# Patient Record
Sex: FEMALE | Race: WHITE | Employment: FULL TIME | URBAN - METROPOLITAN AREA
[De-identification: names, ages, dates, MRNs, and addresses within clinical notes are randomized per-mention and may not be internally consistent; named-entity substitution may affect disease eponyms.]

---

## 2017-09-25 ENCOUNTER — HOSPITAL ENCOUNTER (OUTPATIENT)
Dept: WOMENS IMAGING | Age: 62
Discharge: HOME | End: 2017-09-25
Attending: FAMILY MEDICINE | Admitting: FAMILY MEDICINE

## 2017-09-25 DIAGNOSIS — N64.4 MASTODYNIA OF RIGHT BREAST: ICD-10-CM

## 2017-09-25 DIAGNOSIS — N64.4 MASTODYNIA: ICD-10-CM

## 2017-10-02 ENCOUNTER — HOSPITAL ENCOUNTER (OUTPATIENT)
Dept: WOMENS IMAGING | Age: 62
Discharge: HOME | End: 2017-10-02
Admitting: FAMILY MEDICINE

## 2017-10-02 VITALS
SYSTOLIC BLOOD PRESSURE: 144 MMHG | OXYGEN SATURATION: 99 % | DIASTOLIC BLOOD PRESSURE: 73 MMHG | RESPIRATION RATE: 16 BRPM

## 2017-10-02 DIAGNOSIS — N64.4 MASTODYNIA: ICD-10-CM

## 2017-10-02 DIAGNOSIS — R92.8 ABNORMAL MAMMOGRAM: ICD-10-CM

## 2017-10-02 NOTE — IP AVS SNAPSHOT
Allergies as of 10/2/2017        Reactions    Erythromycin     Macrolides And Ketolides       Immunizations as of 10/2/2017     No immunizations on file. Last Vitals          Most Recent Value    Temp      Pulse      Resp  16    BP  (!)  144/73         After Visit Summary    This summary was created for you. Thank you for entrusting your care to us. The following information includes details about your hospital/visit stay along with steps you should take to help with your recovery once you leave the hospital.  In this packet, you will find information about the topics listed below:    · Instructions about your medications including a list of your home medications  · A summary of your hospital visit  · Follow-up appointments once you have left the hospital  · Your care plan at home      You may receive a survey regarding the care you received during your stay. Your input is valuable to us. We encourage you to complete and return your survey in the envelope provided. We hope you will choose us in the future for your healthcare needs. Patient Information     Patient Name TERESA Coon 1955      Care Provided at:     Name Address Phone       [address] [place] [address] [phone]            Your Visit    Here you will find information about your visit, including the reason for your visit. Please take this sheet with you when you visit your doctor or other health care provider in the future. It will help determine the best possible medical care for you at that time. If you have any questions once you leave the hospital, please call the department phone number listed below. Why you were here     Your primary diagnosis was:  Not on File      Visit Information     Date & Time Department Dept.  Phone    10/2/2017 [facility] [phone]       Follow-up Appointments

## 2017-10-02 NOTE — PROGRESS NOTES
Intra Breast Biopsy Nursing Note    NAME:  Shyam Leiva OF BIRTH:  1955 GENDER: female  MEDICAL RECORD NUMBER:  [id number]  DATE:  10/2/2017     [x]   Breast Biopsy completed by physician.  [x]   Expiration date site marker checked immediately prior to use.  [x]   Package intact prior to use and no damage noted.  [x] Sited marker was removed from protective sterile packaging by           physician.  [x] Site marker was placed by physician into left     breast/s.  [x] Breast Biopsy tissue was placed in proper container/s and labeled.  [x] Breast Biopsy tissue was taken to Pathology for evaluation.      Procedural Pain:  0  / 10     Post Procedural Pain:  0 / 10     Electronically signed by Vanda Bermudez on 10/2/2017 at 11:54 AM    

## 2017-10-02 NOTE — PROGRESS NOTES
Intra Breast Biopsy Nursing Note    NAME:  Talon Golden OF BIRTH:  1955 GENDER: female  MEDICAL RECORD NUMBER:  [id number]  DATE:  10/2/2017     [x]   Breast Biopsy completed by physician.  [x]   Expiration date site marker checked immediately prior to use.  [x]   Package intact prior to use and no damage noted.  [x] Sited marker was removed from protective sterile packaging by           physician.  [x] Site marker was placed by physician into left     breast/s.  [x] Breast Biopsy tissue was placed in proper container/s and labeled.  [x] Breast Biopsy tissue was taken to Pathology for evaluation.      Procedural Pain:  0  / 10     Post Procedural Pain:  0 / 10     Electronically signed by Yancy Henry on 10/2/2017 at 11:55 AM    

## 2017-10-03 NOTE — PLAN OF CARE
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

## 2018-01-17 ENCOUNTER — HOSPITAL ENCOUNTER (OUTPATIENT)
Dept: OTHER | Age: 63
Discharge: HOME | End: 2018-01-17
Attending: FAMILY MEDICINE | Admitting: FAMILY MEDICINE

## 2018-01-17 DIAGNOSIS — R50.9 FEVER, UNSPECIFIED FEVER CAUSE: ICD-10-CM

## 2018-01-17 DIAGNOSIS — R05.9 COUGH: ICD-10-CM

## 2019-01-25 ENCOUNTER — HOSPITAL ENCOUNTER (OUTPATIENT)
Age: 64
Discharge: HOME | End: 2019-01-25
Payer: COMMERCIAL

## 2019-01-25 ENCOUNTER — HOSPITAL ENCOUNTER (OUTPATIENT)
Dept: GENERAL RADIOLOGY | Age: 64
Discharge: HOME | End: 2019-01-25
Payer: COMMERCIAL

## 2019-01-25 DIAGNOSIS — R22.0 FACIAL MASS: ICD-10-CM

## 2019-01-25 DIAGNOSIS — R22.0 FACIAL SWELLING: ICD-10-CM

## 2019-01-25 PROCEDURE — 70150 X-RAY EXAM OF FACIAL BONES: CPT

## 2019-02-25 ENCOUNTER — HOSPITAL ENCOUNTER (OUTPATIENT)
Dept: WOMENS IMAGING | Age: 64
Discharge: HOME | End: 2019-02-25
Payer: COMMERCIAL

## 2019-02-25 DIAGNOSIS — Z12.39 BREAST CANCER SCREENING: ICD-10-CM

## 2019-02-25 PROCEDURE — 77063 BREAST TOMOSYNTHESIS BI: CPT

## 2020-11-02 ENCOUNTER — HOSPITAL ENCOUNTER (OUTPATIENT)
Dept: WOMENS IMAGING | Age: 65
Discharge: HOME | End: 2020-11-02
Payer: COMMERCIAL

## 2020-11-02 ENCOUNTER — HOSPITAL ENCOUNTER (OUTPATIENT)
Dept: WOMENS IMAGING | Age: 65
End: 2020-11-02
Payer: COMMERCIAL

## 2020-11-02 PROCEDURE — 77063 BREAST TOMOSYNTHESIS BI: CPT

## 2022-01-10 ENCOUNTER — HOSPITAL ENCOUNTER (OUTPATIENT)
Dept: WOMENS IMAGING | Age: 67
Discharge: HOME | End: 2022-01-10
Payer: COMMERCIAL

## 2022-01-10 DIAGNOSIS — Z12.31 VISIT FOR SCREENING MAMMOGRAM: ICD-10-CM

## 2022-01-10 PROCEDURE — 77063 BREAST TOMOSYNTHESIS BI: CPT

## 2023-06-26 ENCOUNTER — HOSPITAL ENCOUNTER (OUTPATIENT)
Dept: WOMENS IMAGING | Age: 68
Discharge: HOME | End: 2023-06-26
Payer: MEDICARE

## 2023-06-26 DIAGNOSIS — Z12.31 BREAST CANCER SCREENING BY MAMMOGRAM: ICD-10-CM

## 2023-06-26 PROCEDURE — 77063 BREAST TOMOSYNTHESIS BI: CPT

## 2024-07-29 ENCOUNTER — HOSPITAL ENCOUNTER (OUTPATIENT)
Dept: WOMENS IMAGING | Age: 69
Discharge: HOME | End: 2024-07-29
Payer: MEDICARE

## 2024-07-29 DIAGNOSIS — Z12.31 SCREENING MAMMOGRAM FOR BREAST CANCER: ICD-10-CM

## 2024-07-29 PROCEDURE — 77063 BREAST TOMOSYNTHESIS BI: CPT
